# Patient Record
Sex: FEMALE | Race: WHITE | Employment: OTHER | URBAN - METROPOLITAN AREA
[De-identification: names, ages, dates, MRNs, and addresses within clinical notes are randomized per-mention and may not be internally consistent; named-entity substitution may affect disease eponyms.]

---

## 2017-09-27 ENCOUNTER — OP HISTORICAL/CONVERTED ENCOUNTER (OUTPATIENT)
Dept: OTHER | Age: 82
End: 2017-09-27

## 2017-10-18 ENCOUNTER — IP HISTORICAL/CONVERTED ENCOUNTER (OUTPATIENT)
Dept: OTHER | Age: 82
End: 2017-10-18

## 2017-11-09 ENCOUNTER — OP HISTORICAL/CONVERTED ENCOUNTER (OUTPATIENT)
Dept: OTHER | Age: 82
End: 2017-11-09

## 2017-11-14 ENCOUNTER — OP HISTORICAL/CONVERTED ENCOUNTER (OUTPATIENT)
Dept: OTHER | Age: 82
End: 2017-11-14

## 2017-11-15 ENCOUNTER — OP HISTORICAL/CONVERTED ENCOUNTER (OUTPATIENT)
Dept: OTHER | Age: 82
End: 2017-11-15

## 2017-11-18 ENCOUNTER — IP HISTORICAL/CONVERTED ENCOUNTER (OUTPATIENT)
Dept: OTHER | Age: 82
End: 2017-11-18

## 2017-12-17 PROCEDURE — 3336500001 HSPC ELECTION

## 2018-01-24 ENCOUNTER — HOSPICE ADMISSION (OUTPATIENT)
Dept: HOSPICE | Age: 83
End: 2018-01-24
Payer: OTHER MISCELLANEOUS

## 2018-01-24 ENCOUNTER — HOSPICE ADMISSION (OUTPATIENT)
Dept: HOSPICE | Facility: HOSPICE | Age: 83
End: 2018-01-24

## 2018-01-25 ENCOUNTER — HOSPITAL ENCOUNTER (INPATIENT)
Age: 83
LOS: 5 days | Discharge: HOSPICE | End: 2018-01-30
Attending: INTERNAL MEDICINE | Admitting: INTERNAL MEDICINE

## 2018-01-25 PROCEDURE — 0655 HSPC INPATIENT RESPITE

## 2018-01-26 PROCEDURE — 0655 HSPC INPATIENT RESPITE

## 2018-01-26 NOTE — PROGRESS NOTES
1600:  Patient arrived at [facility] from home for Respite LOC; this pt is a [facility] patient. LINNETTE Gasca from [facility] arrived and gave report and provided orders. Patient was assessed (see Admission Assessment). Son, Jeanie Brittle, was present with home medications. 1700: Some of the home medications did not match the orders placed by [facility]. [address] [place] and spoke to Ernst Wahl, on-call RNCM; she will be coming to make corrections. 1740:  Ernst Wahl from [facility] present; all orders have been corrected and placed; [facility] paper charting is being used. NAME OF PATIENT:  Amira Rosen    LEVEL OF CARE:  Respite    REASON FOR GIP:   n/a    *PATIENT REMAINS ELIGIBLE FOR [facility] LEVEL OF CARE AS EVIDENCED BY: (MUST BE ADDRESSED OF PATIENT GIP)      REASON FOR RESPITE:  Caregiver cannot care for patient due to:  need to go out-of-town d/t damage to property in [place].     O2 SAFETY:  Concentrator positioning (6\" from furniture/drapes), Tanks stored in aguero , No petroleum based products on face while oxygen in use and Oxygen sign on the door    FALL INTERVENTIONS PROVIDED:   Implemented/recommended resources for alarm system (personal alarm, bed alarm, call bell, etc.) , Implemented/recommended environmental changes (remove hazards, lower bed, improve lighting, etc.) and Implemented/recommended increased supervision/assistance    INTERDISPLINARY COMMUNICATION/COLLABORATION:  Physician, MSW, [place] and RN, CNA    NEW MEDICATION INITIATION DOCUMENTATION:  n/a    Reason medication is being initiated:  n/a    MD / Provider name consulted re: change in status / initiation of new medication:  n/a    New Symptom(s):  n/a    New Order(s):  n/a    Name of the person notified of the changes:  n/a    Name of person being taught:  n/a    Instructions given:  n/a    Side Effects taught:  n/a    Response to teaching:  n/a      COMFORTABLE DYING MEASURE:  Is Patient/family satisfied with symptom level?  yes    DISCHARGE PLAN:  Once respite is complete, patient will go home with family and followed by [facility].

## 2018-01-26 NOTE — PROGRESS NOTES
Verbal shift change report given to Abdoulaye Green, RN by Talia Recinos, LETICIA. Report included the following information SBAR, Kardex, Intake/Output and MAR.     1930  Patient arrived with DNR and home medications. [address] [facility] [address] came to write orders and all medications brought from home were continued except for Nitroglycerin 0.4 mg tabs, of which all 25 were locked up along with the patient's home Roxanol 15 ml which was already opened. Brenda delivered a new bottle of Roxanol for patient use here at [facility]. Malathi ordered the following medications which have not been provided:   Levsin 0.125 mg 1 tab SL every 4 hrs PRN secretions; Dulcolax suppository 10 mg daily PRN constipation; Fleet's saline enema daily PRN constipation with no results from dulcolax, Fleet's Mineral oil enema daily PRN constipation with no results with Fleet's saline enema; Zofran 4 mg PO every 4 hours PRN nausea/vomiting; and Tylenol 650 mg PO/UT every 4 hours PRN fever. Call was placed to [employer] and she indicated the patient had not been taking these additional meds at home and would most likely not need them. If needed, they could be ordered from [employer]. Patient did bring Senna-S 8.6-50 mg two tablets twice daily and Malathi indicated it would be fine if we added those to the med profile for bowel prophylaxis.       NAME OF PATIENT:  Amira Rosen     LEVEL OF CARE:  Respite     REASON FOR GIP: Patient not in GIP care at this time.    [address]:  Caregiver cannot care for patient due to:  out of town emergency.     O2 SAFETY:  No petroleum based products on face while oxygen in use and Oxygen sign on the door     FALL INTERVENTIONS PROVIDED:   Implemented/recommended use of non-skid footwear, Implemented/recommended use of fall risk identification flag to all team members, Implemented/recommended assistive devices and encouraged their use, Implemented/recommended resources for alarm system (personal alarm, bed alarm, call bell, etc.) , Implemented/recommended environmental changes (remove hazards, lower bed, improve lighting, etc.) and Implemented/recommended increased supervision/assistance     INTERDISPLINARY COMMUNICATION/COLLABORATION:  Physician, MSW, [place] and RN, CNA     NEW MEDICATION INITIATION DOCUMENTATION:  No new interventions nor medications were begun on this night shift.       Reason medication is being initiated:  N/A     MD / Provider name consulted re: change in status / initiation of new medication:  N/A     New Symptom(s):  N/A     New Order(s):  N/A     Name of the person notified of the changes:  N/A     Name of person being taught:  N/A     Instructions given:  N/A                        Side Effects taught:  N/A     Response to teaching:  N/A      COMFORTABLE DYING MEASURE:  Monitor for pain, dyspnea, agitation and restlessness and intervene accordingly.        Is Patient/family satisfied with symptom level?  yes     DISCHARGE PLAN:  Return home with salvador Corea (POA/primary caregiver) under the continued care of [facility] at end of respite stay.

## 2018-01-26 NOTE — PROGRESS NOTES
715 received report  740 rounded on patient. Patient sleeping, trilogy intact. 0830 assessed patient. Denies pain at present. States when she does have pain, it is in her right hip. 1045 rounded on patient. Patient resting. 1215 patient requesting to take trilogy mask off. Removed mask. Encouraged patient to use call bell when wanting assistance. 1400 rounded; family at bedside. 1630 disimpacted small amount of soft green/brown stool. RK administered fleets enema. Will monitor for results. NAME OF PATIENT:  Amira Rosen    LEVEL OF CARE:  Respite    REASON FOR GIP:   n/a    *PATIENT REMAINS ELIGIBLE FOR [facility] LEVEL OF CARE AS EVIDENCED BY: (MUST BE ADDRESSED OF PATIENT GIP)      REASON FOR RESPITE:  Caregiver cannot care for patient due to:  emergency    O2 SAFETY:  No petroleum based products on face while oxygen in use and Oxygen sign on the door    FALL INTERVENTIONS PROVIDED:   Implemented/recommended use of non-skid footwear, Implemented/recommended use of fall risk identification flag to all team members, Implemented/recommended assistive devices and encouraged their use, Implemented/recommended resources for alarm system (personal alarm, bed alarm, call bell, etc.) , Implemented/recommended environmental changes (remove hazards, lower bed, improve lighting, etc.) and Implemented/recommended increased supervision/assistance    INTERDISPLINARY COMMUNICATION/COLLABORATION:  Physician, MSW, [place] and RN, CNA    NEW MEDICATION INITIATION DOCUMENTATION:  n/a    Reason medication is being initiated:  n/a    MD / Provider name consulted re: change in status / initiation of new medication:  n/a    New Symptom(s):  n/a    New Order(s):  n/a    Name of the person notified of the changes:  n/a    Name of person being taught:  n/a    Instructions given:  N/a     Side Effects taught:  n/a    Response to teaching:  n/a      COMFORTABLE DYING MEASURE:  Is Patient/family satisfied with symptom level? yes    DISCHARGE PLAN:  Return home at end of respite

## 2018-01-27 PROCEDURE — 0655 HSPC INPATIENT RESPITE

## 2018-01-27 NOTE — PROGRESS NOTES
0700 [facility] report from  [address] [facility]   utilizing SBAR, I/O and Kardex  Patient was admitted to the [facility]  RESPITE Level of care   Hospice Diagnosis- COPD   0830 Assessment completed   Neuro- alert oriented with some confusion  Muscular-moves all extremities   Resp-lungs sounds  diminished in bases, resp even unlabored on Bipap most of time only off to eat  Oxygen - 2l nasal cannula and bipap  Cardio-Heart regular, pulses palpable, skin warm and dry, cap refill less than 3 sec, distal extremities warm,   Vitals - 98.3-63-16 98% 140/58  GI- Abd soft, nondistended, nontender, bowel sounds hypoactive, incontinent of bowels,   Diet - Regular diet chopped, takes pills whole  Last BM - 1/26  Appetite - Fair  -incontinent of bladder   Skin/Wounds - intact  Edema- none  Access sites- none  Family- son out of town    36 Visiting with friends  1400 Total bath  1500 Updated son Deb Guzman  1700 Only ate 30 % meal  1900 Report to  oncoming shift, pt has slept most of day. Only awake to eat and then she falls asleep while eating, no prn meds used.       NAME OF PATIENT: Leland Sinha      LEVEL OF CARE:  Respite      REASON FOR GIP: Patient not in GIP care at this time.        REASON FOR RESPITE:  Caregiver cannot care for patient due to:  out of town emergency.      O2 SAFETY:  No petroleum based products on face while oxygen in use and Oxygen sign on the door      FALL INTERVENTIONS PROVIDED:   Implemented/recommended use of non-skid footwear, Implemented/recommended use of fall risk identification flag to all team members, Implemented/recommended assistive devices and encouraged their use, Implemented/recommended resources for alarm system (personal alarm, bed alarm, call bell, etc.) , Implemented/recommended environmental changes (remove hazards, lower bed, improve lighting, etc.) and Implemented/recommended increased supervision/assistance      INTERDISPLINARY COMMUNICATION/COLLABORATION:  Physician, MSW, [place] and RN, CNA      NEW MEDICATION INITIATION DOCUMENTATION:  No new interventions nor medications were begun on this night shift.        Reason medication is being initiated:  N/A      MD / Provider name consulted re: change in status / initiation of new medication:  N/A      New Symptom(s):  N/A      New Order(s):  N/A      Name of the person notified of the changes:  N/A      Name of person being taught:  N/A      Instructions given:  N/A                         Side Effects taught:  N/A      Response to teaching:  N/A       COMFORTABLE DYING MEASURE:  Monitor for pain, dyspnea, agitation and restlessness and intervene accordingly.         Is Patient/family satisfied with symptom level?  yes      DISCHARGE PLAN: Ki  home with salvador Phipps (POA/primary caregiver) under the continued care of [facility] at end of respite stay.

## 2018-01-27 NOTE — PROGRESS NOTES
Verbal shift change report given to Sera Harman RN by Cal Banerjee RN. Report included the following information SBAR, Kardex, Intake/Output and MAR.     2100  Due to patient's difficulty sleeping last night, and the news she shared then that her dog had  just prior to her admission her and mild pain in her right ribcage, patient was medicated with PRN Tramadol and PRN Xanax with her bedtime medications. Patient is wearing her Trilogy BiPAP almost continuously, only removing it for short periods of time. O2 at 2L continuous. Jet neb treatments as scheduled.   Patient expressing new symptom of extreme pain under right breast in the rib cage area. Medicated with Roxanol 0.25 ml sublingual for 8/10 pain. Patient unable to give cause for the pain, although she did share that she does not have a gallbladder. Patient responded well to Roxanol and pain improved to 4/10. Patient indicated this was manageable for her. 0  Son, Lulu Carbon called just as patient was complaining of pain again in the same location. He provided new information that during an incident at home in which the patient was falling to the floor due to weakness/collapse of her legs, he caught her in a bear hug like  and heard a rib pop. She does have a right rib fracture which is the cause of her pain. Although he is reluctant for us to give her the Roxanol, he did indicate he definitely did not want her in pain and to go ahead and medicate her as necessary. During this conversation, he also indicated that as MPOA he does not wish specific medical information to be given out to other family members about her, such as the medications she is taking, prognosis, etc.  He indicated it would be fine to let them know she is here and that she is resting, eating, or other nonmedical information. 0230  On turning again, the patient expressed 8/10 pain and was medicated with Roxanol.   This again brought her pain down and she was able to go back to sleep. NAME OF PATIENT: Bhargavi Rodrigues      LEVEL OF CARE:  Respite      REASON FOR GIP: Patient not in GIP care at this time.        REASON FOR RESPITE:  Caregiver cannot care for patient due to:  out of town emergency.      O2 SAFETY:  No petroleum based products on face while oxygen in use and Oxygen sign on the door      FALL INTERVENTIONS PROVIDED:   Implemented/recommended use of non-skid footwear, Implemented/recommended use of fall risk identification flag to all team members, Implemented/recommended assistive devices and encouraged their use, Implemented/recommended resources for alarm system (personal alarm, bed alarm, call bell, etc.) , Implemented/recommended environmental changes (remove hazards, lower bed, improve lighting, etc.) and Implemented/recommended increased supervision/assistance      INTERDISPLINARY COMMUNICATION/COLLABORATION:  Physician, MSW, [place] and RN, CNA      NEW MEDICATION INITIATION DOCUMENTATION:  No new interventions nor medications were begun on this night shift.        Reason medication is being initiated:  N/A      MD / Provider name consulted re: change in status / initiation of new medication:  N/A      New Symptom(s):  N/A      New Order(s):  N/A      Name of the person notified of the changes:  N/A      Name of person being taught:  N/A      Instructions given:  N/A                         Side Effects taught:  N/A      Response to teaching:  N/A       COMFORTABLE DYING MEASURE:  Monitor for pain, dyspnea, agitation and restlessness and intervene accordingly.        Is Patient/family satisfied with symptom level?  yes      DISCHARGE PLAN: Ki  home with salvador Gallo (POA/primary caregiver) under the continued care of [facility] at end of respite stay.

## 2018-01-27 NOTE — PROGRESS NOTES
1830:  Disimpacted large amount of feces (brown/green, formed). Encouraged pt to help \"bare down\"; pt was able to, though painful (pt yelling \"ow\" with grimacing). Pt still has large amount of feces in rectum but, d/t pain, stopped. Will pass this on to next shift.

## 2018-01-28 PROCEDURE — 0655 HSPC INPATIENT RESPITE

## 2018-01-28 NOTE — PROGRESS NOTES
Problem: Falls - Risk of  Goal: *Absence of Falls  Document Pal Fall Risk and appropriate interventions in the flowsheet. Outcome: Progressing Towards Goal  Fall Risk Interventions:       Mentation Interventions: Bed/chair exit alarm    Medication Interventions: Bed/chair exit alarm    Elimination Interventions: Bed/chair exit alarm, Call light in reach    History of Falls Interventions: Bed/chair exit alarm, Door open when patient unattended        Problem: Hospice Orientation  Goal: Demonstrate understanding of hospice philosophy, plan of care, and home hospice program  The patient/family/caregiver will demonstrate understanding of hospice philosophy, plan of care and the home hospice program as evidenced by participation in meeting the patient's psychosocial, spiritual, medical, and physical needs inclusive of medical supplies/equipment focusing on symptoms. Outcome: Progressing Towards Goal  Son is aware of level of care of Hospice    Problem: Potential for Skin Breakdown  Goal: Demonstrate ability to care for skin, monitor areas of breakdown and demonstrate methods to prevent breakdown  Patient/family/caregiver will demonstrate ability to care for patient's skin, monitor for areas of breakdown, and demonstrate methods to prevent breakdown during hospice care. Outcome: Progressing Towards Goal  Son not available to teach reviewed with patient    Problem: Risk for Falls  Goal: Free of falls during episode of care  Patient will be free of falls during episode of care. Outcome: Progressing Towards Goal  Patient has been free of falls    Problem: Comfort Deficit  Goal: Reduce/control pain  Patient will report that pain has been reduced or controlled through verbal and nonverbal means and that measures to promote comfort are effective.   Outcome: Progressing Towards Goal  Pain is well managed, prn meds available if needed    Problem: Pain  Goal: Verbalize satisfaction of level of comfort and symptom control  Outcome: Progressing Towards Goal  Patient is satisfied with pain management

## 2018-01-28 NOTE — PROGRESS NOTES
1900 - Bedside and Verbal shift change report given to Abby Metzger (oncoming nurse) by Chon Noel (offgoing nurse). Report included the following information SBAR, Kardex and MAR.     0700 - Bedside and Verbal shift change report given to Chon Noel (oncoming nurse) by Jw Abraham RN (offgoing nurse). Report included the following information SBAR, Kardex and MAR. NAME OF PATIENT:  June W Lampman    LEVEL OF CARE:  RESPITE    REASON FOR GIP:   N/A    *PATIENT REMAINS ELIGIBLE FOR [facility] LEVEL OF CARE AS EVIDENCED BY: (MUST BE ADDRESSED OF PATIENT GIP)      REASON FOR RESPITE:  Caregiver cannot care for patient due to:  out of town    O2 SAFETY:  Concentrator positioning (6\" from furniture/drapes), No petroleum based products on face while oxygen in use and Oxygen sign on the door    FALL INTERVENTIONS PROVIDED:   Implemented/recommended use of non-skid footwear, Implemented/recommended use of fall risk identification flag to all team members, Implemented/recommended resources for alarm system (personal alarm, bed alarm, call bell, etc.)  and Implemented/recommended environmental changes (remove hazards, lower bed, improve lighting, etc.)    INTERDISPLINARY COMMUNICATION/COLLABORATION:  Physician, MSW, [place] and RN, CNA    NEW MEDICATION INITIATION DOCUMENTATION:  No new interventions. Reason medication is being initiated:  N/A    MD / Provider name consulted re: change in status / initiation of new medication:  N/A    New Symptom(s):  N/A    New Order(s):  N/A    Name of the person notified of the changes:  N/A    Name of person being taught:  N/A    Instructions given:  N/A    Side Effects taught:  N/A    Response to teaching:  N/A      COMFORTABLE DYING MEASURE:  Is Patient/family satisfied with symptom level?  yes    DISCHARGE PLAN:  Return to home with salvador Duong [facility]) under the continued care of [facility]. Barb Rush

## 2018-01-28 NOTE — PROGRESS NOTES
0700 Received report from  [address]   utilizing SBAR, I/O and Kardex  Patient was admitted to the [facility]  RESPITE Level of care   Hospice Diagnosis- COPD     0800 Assessment completed   Neuro- alert oriented with some confusion  Muscular-moves all extremities, has to be told to move  Resp-lungs sounds  diminished in bases, resp even unlabored on Bipap most of time only off to eat  Oxygen - 2l nasal cannula and bipap via the trilogy  Cardio-Heart regular, pulses palpable, skin warm and dry, cap refill less than 3 sec, distal extremities warm,   Vitals - 98.3-63-16 98% 140/58  GI- Abd soft, nondistended, nontender, bowel sounds hypoactive, incontinent of bowels,   Diet - Regular diet chopped, takes pills whole, states she can feed herself but she doesn't get but a few bites and falls asleep, needs assist  Last BM - 1/26 plan for disimpaction today  Appetite - Fair  -incontinent of bladder wears briefs  Skin/Wounds - intact  Edema- none  Access sites- none  Family- son out of town, calls to get updates    0800 Breathing trmts administered  0900 Fed breakfast ate 90%  0915 Incontinence check brief is saturated bed is soiled, complete bed bath and linen change done, lotion applied  1600 Tolerates breathing trmts, patient taken off Bipap for only meal time one hour or less, has been more alert today and communicative  1630 Pt felt urge to habe BM, unable to relieve self disimpacted bedpan full of stool. Encouraged pt to drink more water between meals, stated that her doctor told her to drink only when she is thirsty.   1900 Report to oncoming shift       NAME OF PATIENT:  Amira Rosen      LEVEL OF CARE:  Respite      REASON FOR GIP: Patient not in GIP care at this time.        REASON FOR RESPITE:  Caregiver cannot care for patient due to:  out of town emergency.      O2 SAFETY:  No petroleum based products on face while oxygen in use and Oxygen sign on the door      FALL INTERVENTIONS PROVIDED:   Implemented/recommended use of non-skid footwear, Implemented/recommended use of fall risk identification flag to all team members, Implemented/recommended assistive devices and encouraged their use, Implemented/recommended resources for alarm system (personal alarm, bed alarm, call bell, etc.) , Implemented/recommended environmental changes (remove hazards, lower bed, improve lighting, etc.) and Implemented/recommended increased supervision/assistance      INTERDISPLINARY COMMUNICATION/COLLABORATION:  Physician, MSW, [place] and RN, CNA      NEW MEDICATION INITIATION DOCUMENTATION:  No new interventions nor medications were begun on this night shift.        Reason medication is being initiated:  N/A      MD / Provider name consulted re: change in status / initiation of new medication:  N/A      New Symptom(s):  N/A      New Order(s):  N/A      Name of the person notified of the changes:  N/A      Name of person being taught:  N/A      Instructions given:  N/A                         Side Effects taught:  N/A      Response to teaching:  N/A       COMFORTABLE DYING MEASURE:  Monitor for pain, dyspnea, agitation and restlessness and intervene accordingly.         Is Patient/family satisfied with symptom level?  yes      DISCHARGE PLAN: Ki  home with salvador Duong (POA/primary caregiver) under the continued care of [facility] at end of respite stay.

## 2018-01-28 NOTE — PROGRESS NOTES
Problem: Falls - Risk of  Goal: *Absence of Falls  Document Pal Fall Risk and appropriate interventions in the flowsheet.    Outcome: Progressing Towards Goal  Fall Risk Interventions:       Mentation Interventions: Bed/chair exit alarm    Medication Interventions: Bed/chair exit alarm    Elimination Interventions: Bed/chair exit alarm    History of Falls Interventions: Bed/chair exit alarm

## 2018-01-28 NOTE — PROGRESS NOTES
Problem: CONSTIPATION - RISK OF  Goal: *Active bowel function  Outcome: Progressing Towards Goal  Patient came to [facility] constipated. She was disimpacted on day of admission. Orders written for bowel regimen using bisacodyl and or enemas however Hospice Nurse did not ordetr these meds and are not available. Last BM 1/26, Patient will have to be disimpacted again today. Plan to speak with Hospice Nurse about providing bowel regimen medications.

## 2018-01-29 PROCEDURE — 0655 HSPC INPATIENT RESPITE

## 2018-01-29 NOTE — PROGRESS NOTES
Problem: Comfort Deficit  Goal: Reduce/control pain  Patient will report that pain has been reduced or controlled through verbal and nonverbal means and that measures to promote comfort are effective. Outcome: Progressing Towards Goal  Assessed for pain and dyspnea. Patient having difficulty breathing with chest pain. Administered morphine for dyspnea and pain. Reassessed with improvement of breathing and denied pain.  Will continue to monitor

## 2018-01-29 NOTE — PROGRESS NOTES
1900 - Bedside and Verbal shift change report given to Abby Metzger (oncoming nurse) by Sunny Rothman (offgoing nurse). Report included the following information SBAR, Kardex and MAR. 2030 - Administered scheduled medications. No swallowing issues noted. Encouraged fluids. 0210 - Pt given 0.25mL of PRN Morphine for complaint of pain to her diaphragm with moaning and her right hand over right upper quadrant. 0230 - Pt continues to moan and place her right hand over her abdomen. Abdomen slightly firm. Encouraged deep breathing exercises. Given PRN Xanax to relieve anxiety. HOB adjusted to decrease pressure in abdomen. Will continue to monitor. 0305 - Pt sleeping quietly with no distress noted. CPAP machine intact. Needs frequent reminder to keep mask on. Will continue to monitor. 0440 - Pt had mask off and needed assistance to place mask on.    0533 - Pt remains to have her eyes closed with no distress noted. Will continue to monitor. 0700 - Bedside and Verbal shift change report given to RN (oncoming nurse) by Gerardo Adam RN (offgoing nurse). Report included the following information SBAR, Kardex and MAR.        NAME OF PATIENT:  Amira Rosen    LEVEL OF CARE:  RESPITE    REASON FOR GIP:   N/A    *PATIENT REMAINS ELIGIBLE FOR [facility] LEVEL OF CARE AS EVIDENCED BY: (MUST BE ADDRESSED OF PATIENT GIP)      REASON FOR RESPITE:  Caregiver cannot care for patient due to:  out of town    O2 SAFETY:  Concentrator positioning (6\" from furniture/drapes), No petroleum based products on face while oxygen in use and Oxygen sign on the door    FALL INTERVENTIONS PROVIDED:   Implemented/recommended use of non-skid footwear, Implemented/recommended use of fall risk identification flag to all team members, Implemented/recommended resources for alarm system (personal alarm, bed alarm, call bell, etc.)  and Implemented/recommended environmental changes (remove hazards, lower bed, improve lighting, etc.)    INTERDISPLINARY COMMUNICATION/COLLABORATION:  Physician, MSW, Jose and RN, CNA    NEW MEDICATION INITIATION DOCUMENTATION:  No new medication initiated. Reason medication is being initiated:  N/A    MD / Provider name consulted re: change in status / initiation of new medication:  N/A    New Symptom(s):  N/A    New Order(s):  N/A    Name of the person notified of the changes:  N/A    Name of person being taught:  N/A    Instructions given:  N/A    Side Effects taught:  N/A    Response to teaching:  N/A      COMFORTABLE DYING MEASURE:  Is Patient/family satisfied with symptom level?  yes    DISCHARGE PLAN:  Will return home after 5-day respite stay under the care of [facility].

## 2018-01-29 NOTE — PROGRESS NOTES
Problem: Falls - Risk of  Goal: *Absence of Falls  Document Pal Fall Risk and appropriate interventions in the flowsheet.    Outcome: Progressing Towards Goal  Fall Risk Interventions:       Mentation Interventions: Bed/chair exit alarm    Medication Interventions: Bed/chair exit alarm    Elimination Interventions: Bed/chair exit alarm, Call light in reach    History of Falls Interventions: Bed/chair exit alarm, Door open when patient unattended

## 2018-01-29 NOTE — PROGRESS NOTES
0700 received report from [address] [place] [address] Pkwy rounded; patient sleeping, trilogy in place. 0845 patient removed trilogy for breakfast. Did not eat at this time. Patient has labored breathing. Returned trilogy. 0900 patient continues to have labored breathing with pursed lips, use of accessory muscles. Called son Abdiel Corea to inform him of her sob and request initiation of her liquid morphine. Abdiel Corea agreed with plan of care. Administered 5 mg morphine sl to patient along with her scheduled medications. Administered her nebulizer treatments. Will continue to monitor. 0920 patient's breathing less labored, continues to use accessary muscles; no pursed lips. Patient states she feels better. Patient periodically pulls off trilogy. Will continue to monitor that trilogy is in place. 1030 patient resting. 1230 patient eating lunch. Sister and friend present and sister feeding patient. Raiza Arenas 37 present.     NAME OF PATIENT:  Amira Rosen    LEVEL OF CARE:  Respite    REASON FOR GIP:   N/A    REASON FOR RESPITE:  Caregiver breakdown    O2 SAFETY:  n/a    FALL INTERVENTIONS PROVIDED:   Implemented/recommended use of non-skid footwear, Implemented/recommended use of fall risk identification flag to all team members, Implemented/recommended assistive devices and encouraged their use, Implemented/recommended resources for alarm system (personal alarm, bed alarm, call bell, etc.) , Implemented/recommended environmental changes (remove hazards, lower bed, improve lighting, etc.) and Implemented/recommended increased supervision/assistance    INTERDISPLINARY COMMUNICATION/COLLABORATION:  Physician, MSW, Jose and RN, CNA    NEW MEDICATION INITIATION DOCUMENTATION:  n/a    COMFORTABLE DYING MEASURE:  Is Patient/family satisfied with symptom level?  yes    DISCHARGE PLAN:  Home following respite

## 2018-01-30 VITALS
SYSTOLIC BLOOD PRESSURE: 128 MMHG | TEMPERATURE: 97.1 F | OXYGEN SATURATION: 97 % | DIASTOLIC BLOOD PRESSURE: 58 MMHG | RESPIRATION RATE: 28 BRPM | HEART RATE: 69 BPM

## 2018-01-30 NOTE — PROGRESS NOTES
1900 - Bedside and Verbal shift change report given to Abby [address]ro[address] (oncoming nurse) by Franco Swanson RN/Lotus RN (offgoing nurse). Report included the following information SBAR, Kardex and MAR.     0700 - Bedside and Verbal shift change report given to Franco Swanson RN/Lotus RN (oncoming nurse) by Glenn Randall RN (offgoing nurse). Report included the following information SBAR, Kardex and MAR. NAME OF PATIENT:  June W Lampman    LEVEL OF CARE:  RESPITE    REASON FOR GIP:   N/A    *PATIENT REMAINS ELIGIBLE FOR [facility] LEVEL OF CARE AS EVIDENCED BY: (MUST BE ADDRESSED OF PATIENT GIP)      REASON FOR RESPITE:  Caregiver cannot care for patient due to:  out-of-town    O2 SAFETY:  Concentrator positioning (6\" from furniture/drapes), No petroleum based products on face while oxygen in use and Oxygen sign on the door    FALL INTERVENTIONS PROVIDED:   Implemented/recommended use of non-skid footwear, Implemented/recommended use of fall risk identification flag to all team members, Implemented/recommended resources for alarm system (personal alarm, bed alarm, call bell, etc.)  and Implemented/recommended environmental changes (remove hazards, lower bed, improve lighting, etc.)    INTERDISPLINARY COMMUNICATION/COLLABORATION:  Physician, MSW, [place] and RN, CNA    NEW MEDICATION INITIATION DOCUMENTATION:  No new medication initiated. Reason medication is being initiated:  N/A    MD / Provider name consulted re: change in status / initiation of new medication:  N/A    New Symptom(s):  N/A    New Order(s):  N/A    Name of the person notified of the changes:  N/A    Name of person being taught:  N/A    Instructions given:  N/A    Side Effects taught:  N/A    Response to teaching:  N/A      COMFORTABLE DYING MEASURE:  Is Patient/family satisfied with symptom level?  yes    DISCHARGE PLAN:  Will return home under the care of [facility].

## 2018-01-30 NOTE — PROGRESS NOTES
1330 respite patient discharged to home via [facility]. DNR, medications, and personal belongings sent with her. Son Bharti Mendoza present and reviewed patient's medications that were given today. Medications handed to Bharti Mendoza. Left VM with [employer]  [facility].

## 2018-01-30 NOTE — HOSPICE
0700 received report from [address] [place] [address] Pky patient sleeping. performed assessment. See simple assessment. 7322 patient having breakfast.   0915 patient having labored breathing, pursed lips; returned trilogy mask to patient and administered nebs. Once patient recovered, wanted to finish eating breakfast.  0945 patient's trilogy mask in place. 1200 performed hygiene with CNA  1230 set up patient for lunch. Neb treatment to be administered before patient discharge.       NAME OF PATIENT:  Amira Rosen    LEVEL OF CARE:  Respite    REASON FOR GIP:   n/a    REASON FOR RESPITE:  Caregiver cannot care for patient due to:  emergency out of town    O2 SAFETY:  Concentrator positioning (6\" from furniture/drapes), No petroleum based products on face while oxygen in use and Oxygen sign on the door    FALL INTERVENTIONS PROVIDED:   Implemented/recommended use of fall risk identification flag to all team members, Implemented/recommended assistive devices and encouraged their use, Implemented/recommended resources for alarm system (personal alarm, bed alarm, call bell, etc.) , Implemented/recommended environmental changes (remove hazards, lower bed, improve lighting, etc.) and Implemented/recommended increased supervision/assistance    INTERDISPLINARY COMMUNICATION/COLLABORATION:  Physician, MSW, Jose and RN, CNA    NEW MEDICATION INITIATION DOCUMENTATION:  n/a    COMFORTABLE DYING MEASURE:  Is Patient/family satisfied with symptom level?  yes    DISCHARGE PLAN:  Home after completion of respite

## 2018-01-30 NOTE — PROGRESS NOTES
Problem: Comfort Deficit  Goal: Reduce/control pain  Patient will report that pain has been reduced or controlled through verbal and nonverbal means and that measures to promote comfort are effective. Outcome: Progressing Towards Goal  Patient's pain and dyspnea improved when patient keeps trilogy mask on.  Reminding patient to keep mask on except when eating meals